# Patient Record
Sex: FEMALE | Race: WHITE | URBAN - METROPOLITAN AREA
[De-identification: names, ages, dates, MRNs, and addresses within clinical notes are randomized per-mention and may not be internally consistent; named-entity substitution may affect disease eponyms.]

---

## 2018-12-20 ENCOUNTER — OFFICE VISIT (OUTPATIENT)
Dept: URBAN - METROPOLITAN AREA CLINIC 81 | Facility: CLINIC | Age: 73
End: 2018-12-20
Payer: MEDICARE

## 2018-12-20 DIAGNOSIS — H25.13 AGE-RELATED NUCLEAR CATARACT, BILATERAL: ICD-10-CM

## 2018-12-20 PROCEDURE — 99214 OFFICE O/P EST MOD 30 MIN: CPT | Performed by: OPHTHALMOLOGY

## 2018-12-20 ASSESSMENT — INTRAOCULAR PRESSURE
OS: 14
OD: 12

## 2018-12-20 NOTE — IMPRESSION/PLAN
Impression: Retinal artery branch occlusion, right eye: H34.231. OD. Dx by Dr Michael Cuello on 08/05/16. Plan: Will continue to observe condition and or symptoms. Consider moving to annual exams if no changes at next visit.

## 2018-12-20 NOTE — IMPRESSION/PLAN
Impression: Diagnosis: Age-related nuclear cataract, bilateral. Code: H25.13. OU. Plan: Discussed. Continue to monitor.

## 2019-07-18 ENCOUNTER — OFFICE VISIT (OUTPATIENT)
Dept: URBAN - METROPOLITAN AREA CLINIC 81 | Facility: CLINIC | Age: 74
End: 2019-07-18
Payer: MEDICARE

## 2019-07-18 DIAGNOSIS — H47.293 OTHER OPTIC ATROPHY, BILATERAL: ICD-10-CM

## 2019-07-18 PROCEDURE — 92014 COMPRE OPH EXAM EST PT 1/>: CPT | Performed by: OPHTHALMOLOGY

## 2019-07-18 ASSESSMENT — INTRAOCULAR PRESSURE
OD: 16
OS: 16

## 2019-07-18 NOTE — IMPRESSION/PLAN
Impression: Retinal artery branch occlusion, right eye: H34.231. OD. Dx by Dr Chelsie Sherman on 08/05/16. Plan: Continue to monitor. discussed with patient the option of going to yearly appts. patient request 6 month followups.

## 2019-07-18 NOTE — IMPRESSION/PLAN
Impression: Diagnosis: Age-related nuclear cataract, bilateral. Code: H25.13. OU. Plan: Continue to monitor.

## 2020-01-16 ENCOUNTER — OFFICE VISIT (OUTPATIENT)
Dept: URBAN - METROPOLITAN AREA CLINIC 81 | Facility: CLINIC | Age: 75
End: 2020-01-16
Payer: MEDICARE

## 2020-01-16 DIAGNOSIS — H47.292 OTHER OPTIC ATROPHY OF LEFT EYE: ICD-10-CM

## 2020-01-16 PROCEDURE — 92014 COMPRE OPH EXAM EST PT 1/>: CPT | Performed by: OPHTHALMOLOGY

## 2020-01-16 ASSESSMENT — INTRAOCULAR PRESSURE
OS: 15
OD: 14

## 2020-01-16 NOTE — IMPRESSION/PLAN
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 
S/P AION OS Plan: Continue to monitor

## 2020-01-16 NOTE — IMPRESSION/PLAN
Impression: Retinal artery branch occlusion, right eye: H34.231. OD. Dx by Dr Ramya Melton on 08/05/16. Plan: Continue to monitor.  patient requests 6 month appts

## 2020-07-16 ENCOUNTER — OFFICE VISIT (OUTPATIENT)
Dept: URBAN - METROPOLITAN AREA CLINIC 81 | Facility: CLINIC | Age: 75
End: 2020-07-16
Payer: MEDICARE

## 2020-07-16 PROCEDURE — 99214 OFFICE O/P EST MOD 30 MIN: CPT | Performed by: OPHTHALMOLOGY

## 2020-07-16 ASSESSMENT — INTRAOCULAR PRESSURE
OD: 12
OS: 14

## 2020-07-16 NOTE — IMPRESSION/PLAN
Impression: Diagnosis: Other optic atrophy of left eye. Code: G86.229. 
S/P AION OS Plan: Continue to monitor

## 2020-07-16 NOTE — IMPRESSION/PLAN
Impression: Retinal artery branch occlusion, right eye: H34.231. OD.  stable Dx by Dr Speedy Vale on 08/05/16. Plan: Continue to monitor.  patient requests 6 month appts

## 2021-06-17 ENCOUNTER — OFFICE VISIT (OUTPATIENT)
Dept: URBAN - METROPOLITAN AREA CLINIC 76 | Facility: CLINIC | Age: 76
End: 2021-06-17
Payer: MEDICARE

## 2021-06-17 DIAGNOSIS — H34.231 RETINAL ARTERY BRANCH OCCLUSION, RIGHT EYE: Primary | ICD-10-CM

## 2021-06-17 PROCEDURE — 92014 COMPRE OPH EXAM EST PT 1/>: CPT | Performed by: OPHTHALMOLOGY

## 2021-06-17 ASSESSMENT — INTRAOCULAR PRESSURE
OS: 15
OD: 16

## 2021-06-17 ASSESSMENT — KERATOMETRY
OS: 48.38
OD: 47.75

## 2021-06-17 NOTE — IMPRESSION/PLAN
Impression: Retinal artery branch occlusion, right eye: H34.231. OD. Stable Dx by Dr Chelsie Sherman on 08/05/16. Plan: Continue to monitor. Pt okay to come back in 1 year.

## 2021-06-17 NOTE — IMPRESSION/PLAN
Impression: Diagnosis: Other optic atrophy of left eye. Code: R41.971. 
S/P AION OS Plan: Continue to monitor

## 2022-04-04 ENCOUNTER — OFFICE VISIT (OUTPATIENT)
Dept: URBAN - METROPOLITAN AREA CLINIC 76 | Facility: CLINIC | Age: 77
End: 2022-04-04
Payer: MEDICARE

## 2022-04-04 PROCEDURE — 99203 OFFICE O/P NEW LOW 30 MIN: CPT | Performed by: OPTOMETRIST

## 2022-04-04 ASSESSMENT — INTRAOCULAR PRESSURE
OD: 16
OS: 16

## 2022-04-04 NOTE — IMPRESSION/PLAN
Impression: Retinal artery branch occlusion, right eye: H34.231  Right. 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 Recommend n/a retina consult. Advised pt she will need to travel to [facility].

## 2022-04-07 ENCOUNTER — OFFICE VISIT (OUTPATIENT)
Dept: URBAN - METROPOLITAN AREA CLINIC 54 | Facility: CLINIC | Age: 77
End: 2022-04-07
Payer: MEDICARE

## 2022-04-07 PROCEDURE — 92134 CPTRZ OPH DX IMG PST SGM RTA: CPT | Performed by: OPHTHALMOLOGY

## 2022-04-07 PROCEDURE — 99204 OFFICE O/P NEW MOD 45 MIN: CPT | Performed by: OPHTHALMOLOGY

## 2022-04-07 PROCEDURE — 92235 FLUORESCEIN ANGRPH MLTIFRAME: CPT | Performed by: OPHTHALMOLOGY

## 2022-04-07 ASSESSMENT — INTRAOCULAR PRESSURE
OD: 8
OS: 9

## 2022-04-07 NOTE — IMPRESSION/PLAN
Impression: Retinal artery branch occlusion, right eye: H34.231  Right. OCT: 
OD: superior BRAO 
OS: wnl FA: 
OD: delayed transit through superior arteriole OS: wnl Plan: Examination and FA confirm the presence of retinal artery occlusion. A plaque was visualized within the retinal artery. The diagnosis, natural history, and prognosis were discussed at length. Possible treatments such as ocular massage, anterior chamber paracentesis, hyperbaric oxygen were discussed but the patient was told that results of these procedures are variable. Pt has had history of carotid artery disease s/p surgery both sides. She has been seen at [facility] for this. She developed symptoms of MERCER 8 days ago. Discussed option of being seen in ER at [facility]. Pt will go to [facility] for stroke work-up including carotid imaging and neuroimaging.  

RTC 1 month DFE OU OCT OU